# Patient Record
Sex: FEMALE | Race: WHITE | Employment: FULL TIME | URBAN - METROPOLITAN AREA
[De-identification: names, ages, dates, MRNs, and addresses within clinical notes are randomized per-mention and may not be internally consistent; named-entity substitution may affect disease eponyms.]

---

## 2019-04-12 ENCOUNTER — OFFICE VISIT (OUTPATIENT)
Dept: FAMILY MEDICINE CLINIC | Age: 29
End: 2019-04-12

## 2019-04-12 ENCOUNTER — HOSPITAL ENCOUNTER (OUTPATIENT)
Dept: LAB | Age: 29
Discharge: HOME | End: 2019-04-12
Payer: COMMERCIAL

## 2019-04-12 VITALS
OXYGEN SATURATION: 98 % | SYSTOLIC BLOOD PRESSURE: 110 MMHG | DIASTOLIC BLOOD PRESSURE: 74 MMHG | BODY MASS INDEX: 24.16 KG/M2 | WEIGHT: 145 LBS | HEART RATE: 70 BPM | RESPIRATION RATE: 17 BRPM | HEIGHT: 65 IN | TEMPERATURE: 98 F

## 2019-04-12 DIAGNOSIS — Z00.00 ANNUAL PHYSICAL EXAM: ICD-10-CM

## 2019-04-12 DIAGNOSIS — Z00.00 ANNUAL PHYSICAL EXAM: Primary | ICD-10-CM

## 2019-04-12 DIAGNOSIS — R82.90 ABNORMAL URINE: ICD-10-CM

## 2019-04-12 LAB
ALBUMIN SERPL-MCNC: 4.5 G/DL (ref 3.4–5)
ALBUMIN/GLOB SERPL: 1.7 {RATIO} (ref 0.8–1.7)
ALP SERPL-CCNC: 58 U/L (ref 45–117)
ALT SERPL-CCNC: 17 U/L (ref 13–56)
ANION GAP SERPL CALC-SCNC: 8 MMOL/L (ref 3–18)
APPEARANCE UR: CLEAR
AST SERPL-CCNC: 12 U/L (ref 15–37)
BACTERIA URNS QL MICRO: ABNORMAL /HPF
BASOPHILS # BLD: 0 K/UL (ref 0–0.1)
BASOPHILS NFR BLD: 1 % (ref 0–2)
BILIRUB SERPL-MCNC: 0.2 MG/DL (ref 0.2–1)
BILIRUB UR QL: NEGATIVE
BUN SERPL-MCNC: 11 MG/DL (ref 7–18)
BUN/CREAT SERPL: 15 (ref 12–20)
CALCIUM SERPL-MCNC: 8.5 MG/DL (ref 8.5–10.1)
CHLORIDE SERPL-SCNC: 106 MMOL/L (ref 100–108)
CHOLEST SERPL-MCNC: 137 MG/DL
CO2 SERPL-SCNC: 26 MMOL/L (ref 21–32)
COLOR UR: YELLOW
CREAT SERPL-MCNC: 0.71 MG/DL (ref 0.6–1.3)
DIFFERENTIAL METHOD BLD: NORMAL
EOSINOPHIL # BLD: 0.1 K/UL (ref 0–0.4)
EOSINOPHIL NFR BLD: 1 % (ref 0–5)
EPITH CASTS URNS QL MICRO: ABNORMAL /LPF (ref 0–5)
ERYTHROCYTE [DISTWIDTH] IN BLOOD BY AUTOMATED COUNT: 12.9 % (ref 11.6–14.5)
GLOBULIN SER CALC-MCNC: 2.7 G/DL (ref 2–4)
GLUCOSE SERPL-MCNC: 88 MG/DL (ref 74–99)
GLUCOSE UR STRIP.AUTO-MCNC: NEGATIVE MG/DL
HCT VFR BLD AUTO: 44.1 % (ref 35–45)
HDLC SERPL-MCNC: 72 MG/DL (ref 40–60)
HDLC SERPL: 1.9 {RATIO} (ref 0–5)
HGB BLD-MCNC: 14.4 G/DL (ref 12–16)
HGB UR QL STRIP: NEGATIVE
KETONES UR QL STRIP.AUTO: NEGATIVE MG/DL
LDLC SERPL CALC-MCNC: 59.6 MG/DL (ref 0–100)
LEUKOCYTE ESTERASE UR QL STRIP.AUTO: NEGATIVE
LIPID PROFILE,FLP: ABNORMAL
LYMPHOCYTES # BLD: 2 K/UL (ref 0.9–3.6)
LYMPHOCYTES NFR BLD: 36 % (ref 21–52)
MCH RBC QN AUTO: 29.4 PG (ref 24–34)
MCHC RBC AUTO-ENTMCNC: 32.7 G/DL (ref 31–37)
MCV RBC AUTO: 90 FL (ref 74–97)
MONOCYTES # BLD: 0.5 K/UL (ref 0.05–1.2)
MONOCYTES NFR BLD: 8 % (ref 3–10)
MUCOUS THREADS URNS QL MICRO: ABNORMAL /LPF
NEUTS SEG # BLD: 3 K/UL (ref 1.8–8)
NEUTS SEG NFR BLD: 54 % (ref 40–73)
NITRITE UR QL STRIP.AUTO: NEGATIVE
PH UR STRIP: 7.5 [PH] (ref 5–8)
PLATELET # BLD AUTO: 278 K/UL (ref 135–420)
PMV BLD AUTO: 9.8 FL (ref 9.2–11.8)
POTASSIUM SERPL-SCNC: 4.3 MMOL/L (ref 3.5–5.5)
PROT SERPL-MCNC: 7.2 G/DL (ref 6.4–8.2)
PROT UR STRIP-MCNC: NEGATIVE MG/DL
RBC # BLD AUTO: 4.9 M/UL (ref 4.2–5.3)
RBC #/AREA URNS HPF: ABNORMAL /HPF (ref 0–5)
SODIUM SERPL-SCNC: 140 MMOL/L (ref 136–145)
SP GR UR REFRACTOMETRY: 1.02 (ref 1–1.03)
TRIGL SERPL-MCNC: 27 MG/DL (ref ?–150)
UROBILINOGEN UR QL STRIP.AUTO: 0.2 EU/DL (ref 0.2–1)
VLDLC SERPL CALC-MCNC: 5.4 MG/DL
WBC # BLD AUTO: 5.5 K/UL (ref 4.6–13.2)
WBC URNS QL MICRO: ABNORMAL /HPF (ref 0–4)

## 2019-04-12 PROCEDURE — 81001 URINALYSIS AUTO W/SCOPE: CPT

## 2019-04-12 PROCEDURE — 36415 COLL VENOUS BLD VENIPUNCTURE: CPT

## 2019-04-12 PROCEDURE — 80061 LIPID PANEL: CPT

## 2019-04-12 PROCEDURE — 85025 COMPLETE CBC W/AUTO DIFF WBC: CPT

## 2019-04-12 PROCEDURE — 80053 COMPREHEN METABOLIC PANEL: CPT

## 2019-04-12 NOTE — PROGRESS NOTES
Elisabet Hampton Chief Complaint Patient presents with [address] [place] [address] Establish Care  Physical  
 
Vitals:  
 04/12/19 0818 BP: 110/74 Pulse: 70 Resp: 17 Temp: 98 °F (36.7 °C) TempSrc: Oral  
SpO2: 98% Weight: 145 lb (65.8 kg) Height: 5' 5\" (1.651 m) PainSc:   0 - No pain LMP: 03/20/2019  
 
 
, 
HPI: Patient is here to establish care and to get annual physical examination on any medication her past medical history is negative except from history of a scoliosis over 50 degrees and she had a surgery for spinal fusion and correction of the scoliosis at the age of 18, patient gets occasional back pain and she is seeing chiropractor and occasional physical therapy. Patient also due for well woman exam and Pap smear that will be scheduled next visit Patient does not smoke does not drink alcohol healthy lifestyle with regular exercise. History reviewed. No pertinent past medical history. Past Surgical History:  
Procedure Laterality Date  HX CERVICAL FUSION Social History Tobacco Use  Smoking status: Never Smoker  Smokeless tobacco: Never Used Substance Use Topics  Alcohol use: Not Currently Family History Problem Relation Age of Onset  Thyroid Disease Mother  Diabetes Father Review of Systems Constitutional: Negative for chills, fever, malaise/fatigue and weight loss. HENT: Negative for congestion, ear discharge, ear pain, hearing loss, nosebleeds, sinus pain and sore throat. Eyes: Negative for blurred vision, double vision and discharge. Respiratory: Negative for cough, hemoptysis, sputum production, shortness of breath and wheezing. Cardiovascular: Negative for chest pain, palpitations, claudication and leg swelling. Gastrointestinal: Negative for abdominal pain, blood in stool, constipation, diarrhea, nausea and vomiting. Genitourinary: Negative for dysuria, frequency, hematuria and urgency. Musculoskeletal: Positive for back pain. Negative for joint pain, myalgias and neck pain. Skin: Negative for itching and rash. Neurological: Negative for dizziness, tingling, sensory change, speech change, focal weakness, weakness and headaches. Psychiatric/Behavioral: Negative for depression, hallucinations, substance abuse and suicidal ideas. The patient is not nervous/anxious and does not have insomnia. Physical Exam  
Constitutional: She is oriented to person, place, and time. She appears well-developed and well-nourished. No distress. HENT:  
Head: Normocephalic and atraumatic. Mouth/Throat: Oropharynx is clear and moist. No oropharyngeal exudate. Eyes: Pupils are equal, round, and reactive to light. Conjunctivae are normal. Right eye exhibits no discharge. Left eye exhibits no discharge. No scleral icterus. Neck: Normal range of motion. Neck supple. No thyromegaly present. Cardiovascular: Normal rate, regular rhythm and normal heart sounds. No murmur heard. Pulmonary/Chest: Effort normal and breath sounds normal. No respiratory distress. She has no wheezes. She has no rales. She exhibits no tenderness. Abdominal: Soft. Bowel sounds are normal. She exhibits no distension. There is no tenderness. There is no rebound. Musculoskeletal: Normal range of motion. She exhibits no edema, tenderness or deformity. Lymphadenopathy:  
  She has no cervical adenopathy. Neurological: She is alert and oriented to person, place, and time. No cranial nerve deficit. Coordination normal.  
Skin: Skin is warm and dry. No rash noted. She is not diaphoretic. No erythema. No pallor. Psychiatric: She has a normal mood and affect. Her behavior is normal. Judgment and thought content normal.  
Nursing note and vitals reviewed. Assessment and plan  
 
Plan of care has been discussed with the patient,  agrees to the plan and verbalized understanding. All  questions were answered 1. Annual physical exam 
 
- CBC WITH AUTOMATED DIFF; Future - LIPID PANEL; Future - METABOLIC PANEL, COMPREHENSIVE; Future - URINALYSIS W/MICROSCOPIC; Future There are no active problems to display for this patient. No results found for this or any previous visit. Hospital Outpatient Visit on 04/12/2019 Component Date Value Ref Range Status  WBC 04/12/2019 5.5  4.6 - 13.2 K/uL Final  
 RBC 04/12/2019 4.90  4.20 - 5.30 M/uL Final  
 HGB 04/12/2019 14.4  12.0 - 16.0 g/dL Final  
 HCT 04/12/2019 44.1  35.0 - 45.0 % Final  
 MCV 04/12/2019 90.0  74.0 - 97.0 FL Final  
 MCH 04/12/2019 29.4  24.0 - 34.0 PG Final  
 MCHC 04/12/2019 32.7  31.0 - 37.0 g/dL Final  
 RDW 04/12/2019 12.9  11.6 - 14.5 % Final  
 PLATELET 77/93/0513 334  135 - 420 K/uL Final  
 MPV 04/12/2019 9.8  9.2 - 11.8 FL Final  
 NEUTROPHILS 04/12/2019 54  40 - 73 % Final  
 LYMPHOCYTES 04/12/2019 36  21 - 52 % Final  
 MONOCYTES 04/12/2019 8  3 - 10 % Final  
 EOSINOPHILS 04/12/2019 1  0 - 5 % Final  
 BASOPHILS 04/12/2019 1  0 - 2 % Final  
 ABS. NEUTROPHILS 04/12/2019 3.0  1.8 - 8.0 K/UL Final  
 ABS. LYMPHOCYTES 04/12/2019 2.0  0.9 - 3.6 K/UL Final  
 ABS. MONOCYTES 04/12/2019 0.5  0.05 - 1.2 K/UL Final  
 ABS. EOSINOPHILS 04/12/2019 0.1  0.0 - 0.4 K/UL Final  
 ABS. BASOPHILS 04/12/2019 0.0  0.0 - 0.1 K/UL Final  
 DF 04/12/2019 AUTOMATED    Final  
 LIPID PROFILE 04/12/2019        Final  
 Cholesterol, total 04/12/2019 137  <200 MG/DL Final  
 Triglyceride 04/12/2019 27  <150 MG/DL Final  
 Comment: The drugs N-acetylcysteine (NAC) and 
Metamiszole have been found to cause falsely 
low results in this chemical assay. Please 
be sure to submit blood samples obtained BEFORE administration of either of these 
drugs to assure correct results.  
  
 HDL Cholesterol 04/12/2019 72* 40 - 60 MG/DL Final  
 LDL, calculated 04/12/2019 59.6  0 - 100 MG/DL Final  
 VLDL, calculated 04/12/2019 5.4  MG/DL Final  
  CHOL/HDL Ratio 04/12/2019 1.9  0 - 5.0   Final  
 Sodium 04/12/2019 140  136 - 145 mmol/L Final  
 Potassium 04/12/2019 4.3  3.5 - 5.5 mmol/L Final  
 Chloride 04/12/2019 106  100 - 108 mmol/L Final  
 CO2 04/12/2019 26  21 - 32 mmol/L Final  
 Anion gap 04/12/2019 8  3.0 - 18 mmol/L Final  
 Glucose 04/12/2019 88  74 - 99 mg/dL Final  
 BUN 04/12/2019 11  7.0 - 18 MG/DL Final  
 Creatinine 04/12/2019 0.71  0.6 - 1.3 MG/DL Final  
 BUN/Creatinine ratio 04/12/2019 15  12 - 20   Final  
 GFR est AA 04/12/2019 >60  >60 ml/min/1.73m2 Final  
 GFR est non-AA 04/12/2019 >60  >60 ml/min/1.73m2 Final  
 Comment: (NOTE) Estimated GFR is calculated using the Modification of Diet in Renal  
Disease (MDRD) Study equation, reported for both  [facility]) and non- Americans (GFRNA), and normalized to 1.73m2  
body surface area. The physician must decide which value applies to  
the patient. The MDRD study equation should only be used in  
individuals age 25 or older. It has not been validated for the  
following: pregnant women, patients with serious comorbid conditions,  
or on certain medications, or persons with extremes of body size,  
muscle mass, or nutritional status.  Calcium 04/12/2019 8.5  8.5 - 10.1 MG/DL Final  
 Bilirubin, total 04/12/2019 0.2  0.2 - 1.0 MG/DL Final  
 ALT (SGPT) 04/12/2019 17  13 - 56 U/L Final  
 AST (SGOT) 04/12/2019 12* 15 - 37 U/L Final  
 Alk.  phosphatase 04/12/2019 58  45 - 117 U/L Final  
 Protein, total 04/12/2019 7.2  6.4 - 8.2 g/dL Final  
 Albumin 04/12/2019 4.5  3.4 - 5.0 g/dL Final  
 Globulin 04/12/2019 2.7  2.0 - 4.0 g/dL Final  
 A-G Ratio 04/12/2019 1.7  0.8 - 1.7   Final  
 Color 04/12/2019 YELLOW    Final  
 Appearance 04/12/2019 CLEAR    Final  
 Specific gravity 04/12/2019 1.020  1.005 - 1.030   Final  
 pH (UA) 04/12/2019 7.5  5.0 - 8.0   Final  
 Protein 04/12/2019 NEGATIVE   NEG mg/dL Final  
  Glucose 04/12/2019 NEGATIVE   NEG mg/dL Final  
 Ketone 04/12/2019 NEGATIVE   NEG mg/dL Final  
 Bilirubin 04/12/2019 NEGATIVE   NEG   Final  
 Blood 04/12/2019 NEGATIVE   NEG   Final  
 Urobilinogen 04/12/2019 0.2  0.2 - 1.0 EU/dL Final  
 Nitrites 04/12/2019 NEGATIVE   NEG   Final  
 Leukocyte Esterase 04/12/2019 NEGATIVE   NEG   Final  
 WBC 04/12/2019 0 to 3  0 - 4 /hpf Final  
 RBC 04/12/2019 0 to 3  0 - 5 /hpf Final  
 Epithelial cells 04/12/2019 2+  0 - 5 /lpf Final  
 Bacteria 04/12/2019 2+* NEG /hpf Final  
 Mucus 04/12/2019 1+* NEG /lpf Final  
  
 
 
Follow-up and Dispositions · Return in about 1 month (around 5/12/2019) for annual phyiscal .

## 2019-04-12 NOTE — PROGRESS NOTES
Amos Coates is a 34 y.o. female presents to office for physical 
 
Medication list has been reviewed with Amos Coates and updated as of today's date Health Maintenance items with a due date reviewed with patient: 
Health Maintenance Due Topic Date Due  
 HPV Age 9Y-34Y (3 - Female 3-dose series) 10/22/2007  DTaP/Tdap/Td series (1 - Tdap) 10/22/2013  PAP AKA CERVICAL CYTOLOGY  10/22/2013

## 2019-04-17 NOTE — PROGRESS NOTES
All results are within normal limit    Except urinary test has some bacteria if the patient having any symptoms she should come back for urine culture, otherwise it can be addressed next visit

## 2019-04-24 ENCOUNTER — TELEPHONE (OUTPATIENT)
Dept: FAMILY MEDICINE CLINIC | Age: 29
End: 2019-04-24

## 2019-04-24 NOTE — TELEPHONE ENCOUNTER
----- Message from Patito Marte MD sent at 4/17/2019  6:32 PM EDT -----  All results are within normal limit    Except urinary test has some bacteria if the patient having any symptoms she should come back for urine culture, otherwise it can be addressed next visit

## 2019-05-04 ENCOUNTER — HOSPITAL ENCOUNTER (OUTPATIENT)
Dept: LAB | Age: 29
Discharge: HOME | End: 2019-05-04
Payer: COMMERCIAL

## 2019-05-04 ENCOUNTER — OFFICE VISIT (OUTPATIENT)
Dept: FAMILY MEDICINE CLINIC | Age: 29
End: 2019-05-04

## 2019-05-04 VITALS
WEIGHT: 147.6 LBS | BODY MASS INDEX: 24.59 KG/M2 | SYSTOLIC BLOOD PRESSURE: 113 MMHG | DIASTOLIC BLOOD PRESSURE: 69 MMHG | OXYGEN SATURATION: 99 % | RESPIRATION RATE: 15 BRPM | HEIGHT: 65 IN | HEART RATE: 86 BPM | TEMPERATURE: 99.2 F

## 2019-05-04 DIAGNOSIS — Z12.4 SCREENING FOR CERVICAL CANCER: ICD-10-CM

## 2019-05-04 DIAGNOSIS — Z01.419 WELL WOMAN EXAM: Primary | ICD-10-CM

## 2019-05-04 PROCEDURE — 88175 CYTOPATH C/V AUTO FLUID REDO: CPT

## 2019-05-04 NOTE — PROGRESS NOTES
Vishnu Hutchinson Chief Complaint Patient presents with  Well Woman Vitals:  
 05/04/19 1410 BP: 113/69 Pulse: 86 Resp: 15 Temp: 99.2 °F (37.3 °C) TempSrc: Temporal  
SpO2: 99% Weight: 147 lb 9.6 oz (67 kg) Height: 5' 5\" (1.651 m) PainSc:   0 - No pain HPI: She is here for well woman examination she has no complaint today. Blood work has been reviewed with her from her physical all within normal limits, mucus and bacteria in the urine patient is asymptomatic no need for any further testing. Patient does not smoke does not drink alcohol and exercise regularly. Family history of hypertension in father. No past medical history on file. Past Surgical History:  
Procedure Laterality Date  HX CERVICAL FUSION Social History Tobacco Use  Smoking status: Never Smoker  Smokeless tobacco: Never Used Substance Use Topics  Alcohol use: Not Currently Family History Problem Relation Age of Onset  Thyroid Disease Mother  Diabetes Father Review of Systems Constitutional: Negative for chills, fever, malaise/fatigue and weight loss. HENT: Negative for congestion, ear discharge, ear pain, hearing loss and nosebleeds. Eyes: Negative for blurred vision, double vision and discharge. Respiratory: Negative for cough. Cardiovascular: Negative for chest pain, palpitations, claudication and leg swelling. Gastrointestinal: Negative for abdominal pain, constipation, diarrhea, nausea and vomiting. Genitourinary: Negative for dysuria, frequency and urgency. Musculoskeletal: Negative for myalgias. Skin: Negative for itching and rash. Neurological: Negative for dizziness, tingling, sensory change, speech change, focal weakness, weakness and headaches. Psychiatric/Behavioral: Negative for depression and suicidal ideas. Physical Exam  
Constitutional: She is oriented to person, place, and time.  She appears well-developed and well-nourished. No distress. HENT:  
Head: Normocephalic and atraumatic. Mouth/Throat: Oropharynx is clear and moist. No oropharyngeal exudate. Eyes: Pupils are equal, round, and reactive to light. Conjunctivae are normal. Right eye exhibits no discharge. Left eye exhibits no discharge. No scleral icterus. Neck: No thyromegaly present. Cardiovascular: Normal rate, regular rhythm and normal heart sounds. No murmur heard. Pulmonary/Chest: Effort normal and breath sounds normal. No respiratory distress. She has no wheezes. She has no rales. She exhibits no tenderness. Abdominal: Soft. She exhibits no distension. There is no tenderness. There is no rebound. Musculoskeletal: Normal range of motion. She exhibits no edema, tenderness or deformity. Neurological: She is alert and oriented to person, place, and time. No cranial nerve deficit. Coordination normal.  
Skin: Skin is warm and dry. No rash noted. She is not diaphoretic. No erythema. No pallor. Psychiatric: She has a normal mood and affect. Her behavior is normal. Judgment and thought content normal.  
Nursing note and vitals reviewed. Breasts: breasts appear normal, no suspicious masses, no skin or nipple changes or axillary nodes. Pap Smear Procedure After obtaining verbal consent . and patient was placed in the lithectomy position VULVA: normal appearing vulva with no masses, tenderness or lesions, VAGINA: normal appearing vagina with normal color and discharge, no lesions, CERVIX: normal 
 
Pap smear sample  was obtained using silicon broom . appearing cervix without discharge or lesions, UTERUS: uterus is normal size, shape, consistency and nontender, ADNEXA: normal adnexa in size, nontender and no masses. Patient tolerated procedure well Assessment and plan  
 
Plan of care has been discussed with the patient, he agrees to the plan and verbalized understanding. All his questions were answered More than 50% of the time spent in this visit was counseling the patient about  illness and treatment options 1. Screening for cervical cancer - PAP IG, RFX HPV ASCU, 53&02,49([id number]); Future 2. Well woman exam 
 
- PAP IG, RFX HPV ASCU, 75&61,08([id number]); Future There are no active problems to display for this patient. Results for orders placed or performed during the hospital encounter of 04/12/19 CBC WITH AUTOMATED DIFF Result Value Ref Range WBC 5.5 4.6 - 13.2 K/uL  
 RBC 4.90 4.20 - 5.30 M/uL  
 HGB 14.4 12.0 - 16.0 g/dL HCT 44.1 35.0 - 45.0 % MCV 90.0 74.0 - 97.0 FL  
 MCH 29.4 24.0 - 34.0 PG  
 MCHC 32.7 31.0 - 37.0 g/dL  
 RDW 12.9 11.6 - 14.5 % PLATELET 706 425 - 807 K/uL MPV 9.8 9.2 - 11.8 FL  
 NEUTROPHILS 54 40 - 73 % LYMPHOCYTES 36 21 - 52 % MONOCYTES 8 3 - 10 % EOSINOPHILS 1 0 - 5 % BASOPHILS 1 0 - 2 %  
 ABS. NEUTROPHILS 3.0 1.8 - 8.0 K/UL  
 ABS. LYMPHOCYTES 2.0 0.9 - 3.6 K/UL  
 ABS. MONOCYTES 0.5 0.05 - 1.2 K/UL  
 ABS. EOSINOPHILS 0.1 0.0 - 0.4 K/UL  
 ABS. BASOPHILS 0.0 0.0 - 0.1 K/UL  
 DF AUTOMATED    
LIPID PANEL Result Value Ref Range LIPID PROFILE Cholesterol, total 137 <200 MG/DL Triglyceride 27 <150 MG/DL  
 HDL Cholesterol 72 (H) 40 - 60 MG/DL  
 LDL, calculated 59.6 0 - 100 MG/DL VLDL, calculated 5.4 MG/DL  
 CHOL/HDL Ratio 1.9 0 - 5.0 METABOLIC PANEL, COMPREHENSIVE Result Value Ref Range Sodium 140 136 - 145 mmol/L Potassium 4.3 3.5 - 5.5 mmol/L Chloride 106 100 - 108 mmol/L  
 CO2 26 21 - 32 mmol/L Anion gap 8 3.0 - 18 mmol/L Glucose 88 74 - 99 mg/dL BUN 11 7.0 - 18 MG/DL Creatinine 0.71 0.6 - 1.3 MG/DL  
 BUN/Creatinine ratio 15 12 - 20 GFR est AA >60 >60 ml/min/1.73m2 GFR est non-AA >60 >60 ml/min/1.73m2 Calcium 8.5 8.5 - 10.1 MG/DL Bilirubin, total 0.2 0.2 - 1.0 MG/DL  
 ALT (SGPT) 17 13 - 56 U/L  
 AST (SGOT) 12 (L) 15 - 37 U/L Alk.  phosphatase 58 45 - 117 U/L  
 Protein, total 7.2 6.4 - 8.2 g/dL Albumin 4.5 3.4 - 5.0 g/dL Globulin 2.7 2.0 - 4.0 g/dL A-G Ratio 1.7 0.8 - 1.7 URINALYSIS W/MICROSCOPIC Result Value Ref Range Color YELLOW Appearance CLEAR Specific gravity 1.020 1.005 - 1.030    
 pH (UA) 7.5 5.0 - 8.0 Protein NEGATIVE  NEG mg/dL Glucose NEGATIVE  NEG mg/dL Ketone NEGATIVE  NEG mg/dL Bilirubin NEGATIVE  NEG Blood NEGATIVE  NEG Urobilinogen 0.2 0.2 - 1.0 EU/dL Nitrites NEGATIVE  NEG Leukocyte Esterase NEGATIVE  NEG    
 WBC 0 to 3 0 - 4 /hpf  
 RBC 0 to 3 0 - 5 /hpf Epithelial cells 2+ 0 - 5 /lpf Bacteria 2+ (A) NEG /hpf Mucus 1+ (A) NEG /lpf Hospital Outpatient Visit on 04/12/2019 Component Date Value Ref Range Status  WBC 04/12/2019 5.5  4.6 - 13.2 K/uL Final  
 RBC 04/12/2019 4.90  4.20 - 5.30 M/uL Final  
 HGB 04/12/2019 14.4  12.0 - 16.0 g/dL Final  
 HCT 04/12/2019 44.1  35.0 - 45.0 % Final  
 MCV 04/12/2019 90.0  74.0 - 97.0 FL Final  
 MCH 04/12/2019 29.4  24.0 - 34.0 PG Final  
 MCHC 04/12/2019 32.7  31.0 - 37.0 g/dL Final  
 RDW 04/12/2019 12.9  11.6 - 14.5 % Final  
 PLATELET 43/69/2412 331  135 - 420 K/uL Final  
 MPV 04/12/2019 9.8  9.2 - 11.8 FL Final  
 NEUTROPHILS 04/12/2019 54  40 - 73 % Final  
 LYMPHOCYTES 04/12/2019 36  21 - 52 % Final  
 MONOCYTES 04/12/2019 8  3 - 10 % Final  
 EOSINOPHILS 04/12/2019 1  0 - 5 % Final  
 BASOPHILS 04/12/2019 1  0 - 2 % Final  
 ABS. NEUTROPHILS 04/12/2019 3.0  1.8 - 8.0 K/UL Final  
 ABS. LYMPHOCYTES 04/12/2019 2.0  0.9 - 3.6 K/UL Final  
 ABS. MONOCYTES 04/12/2019 0.5  0.05 - 1.2 K/UL Final  
 ABS. EOSINOPHILS 04/12/2019 0.1  0.0 - 0.4 K/UL Final  
 ABS.  BASOPHILS 04/12/2019 0.0  0.0 - 0.1 K/UL Final  
 DF 04/12/2019 AUTOMATED    Final  
 LIPID PROFILE 04/12/2019        Final  
 Cholesterol, total 04/12/2019 137  <200 MG/DL Final  
 Triglyceride 04/12/2019 27  <150 MG/DL Final  
 Comment: The drugs N-acetylcysteine (NAC) and 
Metamiszole have been found to cause falsely 
low results in this chemical assay. Please 
be sure to submit blood samples obtained BEFORE administration of either of these 
drugs to assure correct results.  HDL Cholesterol 04/12/2019 72* 40 - 60 MG/DL Final  
 LDL, calculated 04/12/2019 59.6  0 - 100 MG/DL Final  
 VLDL, calculated 04/12/2019 5.4  MG/DL Final  
 CHOL/HDL Ratio 04/12/2019 1.9  0 - 5.0   Final  
 Sodium 04/12/2019 140  136 - 145 mmol/L Final  
 Potassium 04/12/2019 4.3  3.5 - 5.5 mmol/L Final  
 Chloride 04/12/2019 106  100 - 108 mmol/L Final  
 CO2 04/12/2019 26  21 - 32 mmol/L Final  
 Anion gap 04/12/2019 8  3.0 - 18 mmol/L Final  
 Glucose 04/12/2019 88  74 - 99 mg/dL Final  
 BUN 04/12/2019 11  7.0 - 18 MG/DL Final  
 Creatinine 04/12/2019 0.71  0.6 - 1.3 MG/DL Final  
 BUN/Creatinine ratio 04/12/2019 15  12 - 20   Final  
 GFR est AA 04/12/2019 >60  >60 ml/min/1.73m2 Final  
 GFR est non-AA 04/12/2019 >60  >60 ml/min/1.73m2 Final  
 Comment: (NOTE) Estimated GFR is calculated using the Modification of Diet in Renal  
Disease (MDRD) Study equation, reported for both  [facility] [place]) and non- Americans (GFRNA), and normalized to 1.73m2  
body surface area. The physician must decide which value applies to  
the patient. The MDRD study equation should only be used in  
individuals age 25 or older. It has not been validated for the  
following: pregnant women, patients with serious comorbid conditions,  
or on certain medications, or persons with extremes of body size,  
muscle mass, or nutritional status.  Calcium 04/12/2019 8.5  8.5 - 10.1 MG/DL Final  
 Bilirubin, total 04/12/2019 0.2  0.2 - 1.0 MG/DL Final  
 ALT (SGPT) 04/12/2019 17  13 - 56 U/L Final  
 AST (SGOT) 04/12/2019 12* 15 - 37 U/L Final  
 Alk.  phosphatase 04/12/2019 58  45 - 117 U/L Final  
  Protein, total 04/12/2019 7.2  6.4 - 8.2 g/dL Final  
 Albumin 04/12/2019 4.5  3.4 - 5.0 g/dL Final  
 Globulin 04/12/2019 2.7  2.0 - 4.0 g/dL Final  
 A-G Ratio 04/12/2019 1.7  0.8 - 1.7   Final  
 Color 04/12/2019 YELLOW    Final  
 Appearance 04/12/2019 CLEAR    Final  
 Specific gravity 04/12/2019 1.020  1.005 - 1.030   Final  
 pH (UA) 04/12/2019 7.5  5.0 - 8.0   Final  
 Protein 04/12/2019 NEGATIVE   NEG mg/dL Final  
 Glucose 04/12/2019 NEGATIVE   NEG mg/dL Final  
 Ketone 04/12/2019 NEGATIVE   NEG mg/dL Final  
 Bilirubin 04/12/2019 NEGATIVE   NEG   Final  
 Blood 04/12/2019 NEGATIVE   NEG   Final  
 Urobilinogen 04/12/2019 0.2  0.2 - 1.0 EU/dL Final  
 Nitrites 04/12/2019 NEGATIVE   NEG   Final  
 Leukocyte Esterase 04/12/2019 NEGATIVE   NEG   Final  
 WBC 04/12/2019 0 to 3  0 - 4 /hpf Final  
 RBC 04/12/2019 0 to 3  0 - 5 /hpf Final  
 Epithelial cells 04/12/2019 2+  0 - 5 /lpf Final  
 Bacteria 04/12/2019 2+* NEG /hpf Final  
 Mucus 04/12/2019 1+* NEG /lpf Final  
  
 
 
Follow-up and Dispositions · Return in about 1 year (around 5/4/2020) for Per results.

## 2019-05-04 NOTE — PROGRESS NOTES
Kaylee Dancer is a 34 y.o. female (: 1990) presenting to address: Chief Complaint Patient presents with  Well Woman Vitals:  
 19 1410 BP: 113/69 Pulse: 86 Resp: 15 Temp: 99.2 °F (37.3 °C) TempSrc: Temporal  
SpO2: 99% Weight: 147 lb 9.6 oz (67 kg) Height: 5' 5\" (1.651 m) PainSc:   0 - No pain Hearing/Vision: No exam data present Learning Assessment:  
 
Learning Assessment 2019 PRIMARY LEARNER Patient PRIMARY LANGUAGE ENGLISH  
LEARNER PREFERENCE PRIMARY DEMONSTRATION  
ANSWERED BY patient RELATIONSHIP SELF Depression Screening:  
 
3 most recent PHQ Screens 2019 Little interest or pleasure in doing things Not at all Feeling down, depressed, irritable, or hopeless Not at all Total Score PHQ 2 0 Fall Risk Assessment:  
 
Fall Risk Assessment, last 12 mths 2019 Able to walk? Yes Fall in past 12 months? No  
 
Abuse Screening:  
 
Abuse Screening Questionnaire 2019 Do you ever feel afraid of your partner? Duc Keenan Are you in a relationship with someone who physically or mentally threatens you? Duc Keenan Is it safe for you to go home? Alessia Mills Coordination of Care Questionaire: 1. Have you been to the ER, urgent care clinic since your last visit? Hospitalized since your last visit? NO 
 
2. Have you seen or consulted any other health care providers outside of the [address] [place] [address] since your last visit? Include any pap smears or colon screening.  NO

## 2020-08-20 ENCOUNTER — VIRTUAL VISIT (OUTPATIENT)
Dept: FAMILY MEDICINE CLINIC | Age: 30
End: 2020-08-20

## 2020-08-20 DIAGNOSIS — M54.16 RADICULOPATHY, LUMBAR REGION: ICD-10-CM

## 2020-08-20 DIAGNOSIS — M54.40 LOW BACK PAIN WITH SCIATICA, SCIATICA LATERALITY UNSPECIFIED, UNSPECIFIED BACK PAIN LATERALITY, UNSPECIFIED CHRONICITY: Primary | ICD-10-CM

## 2020-08-20 NOTE — PROGRESS NOTES
Zahra Oropeza is a 34 y.o. female who was seen by synchronous (real-time) audio-video technology on 8/20/2020 for LOW BACK PAIN    Patient has been complaining about low back pain, and she had physical therapy last year her symptoms improved by physical therapy, but now she had recurrence of symptoms which is low back pain  That radiates to her right hip and right thigh area with some numbness, she is currently undergoing physical therapy and she required to have a referral  If there is no improvement in her symptoms consider x-ray      Assessment & Plan:   Diagnoses and all orders for this visit:    1. Low back pain with sciatica, sciatica laterality unspecified, unspecified back pain laterality, unspecified chronicity  -     REFERRAL TO PHYSIATRY  -     XR SPINE LUMB 2 OR 3 V; Future    2. Radiculopathy, lumbar region  -     REFERRAL TO PHYSIATRY  -     XR SPINE LUMB 2 OR 3 V; Future          712  Subjective:       Prior to Admission medications    Not on File     There is no problem list on file for this patient. There are no active problems to display for this patient. No Known Allergies  No past medical history on file. Past Surgical History:   Procedure Laterality Date    HX CERVICAL FUSION       Family History   Problem Relation Age of Onset    Thyroid Disease Mother     Diabetes Father      Social History     Tobacco Use    Smoking status: Never Smoker    Smokeless tobacco: Never Used   Substance Use Topics    Alcohol use: Not Currently       Review of Systems   Constitutional: Negative for chills, fever, malaise/fatigue and weight loss. HENT: Negative for congestion, ear discharge, ear pain, hearing loss and nosebleeds. Eyes: Negative for blurred vision, double vision and discharge. Respiratory: Negative for cough. Cardiovascular: Negative for chest pain, palpitations, claudication and leg swelling.    Gastrointestinal: Negative for abdominal pain, constipation, diarrhea, nausea and vomiting. Genitourinary: Negative for dysuria, frequency and urgency. Musculoskeletal: Positive for back pain. Negative for falls, joint pain, myalgias and neck pain. Skin: Negative for itching and rash. Neurological: Positive for tingling. Negative for dizziness, sensory change, speech change, focal weakness, weakness and headaches. Psychiatric/Behavioral: Negative for depression and suicidal ideas. Objective:   No flowsheet data found. General: alert, cooperative, no distress   Mental  status: normal mood, behavior, speech, dress, motor activity, and thought processes, able to follow commands   HENT: NCAT   Neck: no visualized mass   Resp: no respiratory distress   Neuro: no gross deficits   Skin: no discoloration or lesions of concern on visible areas   Psychiatric: normal affect, consistent with stated mood, no evidence of hallucinations     Additional exam findings: We discussed the expected course, resolution and complications of the diagnosis(es) in detail. Medication risks, benefits, costs, interactions, and alternatives were discussed as indicated. I advised her to contact the office if her condition worsens, changes or fails to improve as anticipated. She expressed understanding with the diagnosis(es) and plan. Elizabeth Mills, who was evaluated through a patient-initiated, synchronous (real-time) audio-video encounter, and/or her healthcare decision maker, is aware that it is a billable service, with coverage as determined by her insurance carrier. She provided verbal consent to proceed: Yes, and patient identification was verified. It was conducted pursuant to the emergency declaration under the [facility], [address] [place] [address] authority and the [employer] Act. A caregiver was present when appropriate. Ability to conduct physical exam was limited. I was in the office.  The patient was at home.      Nash Rosas MD

## 2020-09-14 ENCOUNTER — TELEPHONE (OUTPATIENT)
Dept: FAMILY MEDICINE CLINIC | Age: 30
End: 2020-09-14

## 2021-05-21 ENCOUNTER — OFFICE VISIT (OUTPATIENT)
Dept: FAMILY MEDICINE CLINIC | Age: 31
End: 2021-05-21
Payer: COMMERCIAL

## 2021-05-21 VITALS
RESPIRATION RATE: 14 BRPM | SYSTOLIC BLOOD PRESSURE: 125 MMHG | HEART RATE: 89 BPM | WEIGHT: 151.4 LBS | DIASTOLIC BLOOD PRESSURE: 83 MMHG | TEMPERATURE: 98.7 F | OXYGEN SATURATION: 100 % | HEIGHT: 65 IN | BODY MASS INDEX: 25.22 KG/M2

## 2021-05-21 DIAGNOSIS — N64.4 PAINFUL LUMPY LEFT BREAST: Primary | ICD-10-CM

## 2021-05-21 DIAGNOSIS — N63.11 BREAST LUMP ON RIGHT SIDE AT 11 O'CLOCK POSITION: ICD-10-CM

## 2021-05-21 DIAGNOSIS — N63.20 PAINFUL LUMPY LEFT BREAST: Primary | ICD-10-CM

## 2021-05-21 PROCEDURE — 99213 OFFICE O/P EST LOW 20 MIN: CPT | Performed by: LEGAL MEDICINE

## 2021-05-21 NOTE — PROGRESS NOTES
Lucas Price is a 31 y.o. female (: 1990) presenting to address:    Chief Complaint   Patient presents with    Breast Problem     left lump       Vitals:    21 1513   BP: 125/83   Pulse: 89   Resp: 14   Temp: 98.7 °F (37.1 °C)   TempSrc: Temporal   SpO2: 100%   Weight: 151 lb 6.4 oz (68.7 kg)   Height: 5' 5\" (1.651 m)   PainSc:   0 - No pain   LMP: 2021       Is someone accompanying this pt? NO    Is the patient using any DME equipment during OV? NO    Hearing/Vision:   No exam data present    Learning Assessment:     Learning Assessment 2019   PRIMARY LEARNER Patient   PRIMARY LANGUAGE ENGLISH   LEARNER PREFERENCE PRIMARY DEMONSTRATION   ANSWERED BY patient   RELATIONSHIP SELF     Depression Screening:     3 most recent PHQ Screens 2021   Little interest or pleasure in doing things Not at all   Feeling down, depressed, irritable, or hopeless Not at all   Total Score PHQ 2 0     Fall Risk Assessment:     Fall Risk Assessment, last 12 mths 2019   Able to walk? Yes   Fall in past 12 months? No     Coordination of Care Questionaire:   1. Have you been to the ER, urgent care clinic since your last visit? Hospitalized since your last visit? NO    2. Have you seen or consulted any other health care providers outside of the [address] [place] [address] since your last visit? Include any pap smears or colon screening. NO    Advanced Directive:   1. Do you have an Advanced Directive? NO    2. Would you like information on Advanced Directives?  NO

## 2021-05-21 NOTE — PROGRESS NOTES
Donnell Adkins     Chief Complaint   Patient presents with    Breast Problem     left lump     Vitals:    05/21/21 1513   BP: 125/83   Pulse: 89   Resp: 14   Temp: 98.7 °F (37.1 °C)   TempSrc: Temporal   SpO2: 100%   Weight: 151 lb 6.4 oz (68.7 kg)   Height: 5' 5\" (1.651 m)   PainSc:   0 - No pain   LMP: 05/18/2021         HPI: left breast pain for  Over one  month and lump no nipple  Discharged  FH of Breast cancer is negative     Mother has  endometrial cancer no family history of breast cancer    She is to have breast lumps during menstruation but it would go away but this lump has stayed for over 1 month    No past medical history on file. Past Surgical History:   Procedure Laterality Date    HX CERVICAL FUSION       Social History     Tobacco Use    Smoking status: Never Smoker    Smokeless tobacco: Never Used   Substance Use Topics    Alcohol use: Not Currently       Family History   Problem Relation Age of Onset    Thyroid Disease Mother     Diabetes Father        Review of Systems   Constitutional: Negative for chills, fever, malaise/fatigue and weight loss. HENT: Negative for congestion, ear discharge, ear pain, hearing loss and nosebleeds. Eyes: Negative for blurred vision, double vision and discharge. Respiratory: Negative for cough, hemoptysis, sputum production, shortness of breath and wheezing. Cardiovascular: Negative for chest pain, palpitations, claudication and leg swelling. Gastrointestinal: Negative for abdominal pain, constipation, diarrhea, nausea and vomiting. Genitourinary: Negative for dysuria, frequency and urgency. Musculoskeletal: Negative for back pain, joint pain, myalgias and neck pain. Skin: Negative for itching and rash. Neurological: Negative for dizziness, tingling, sensory change, speech change, focal weakness, weakness and headaches. Psychiatric/Behavioral: Negative for depression and suicidal ideas.        Physical Exam  Constitutional: General: She is not in acute distress. Appearance: She is well-developed and normal weight. She is not diaphoretic. HENT:      Head: Normocephalic and atraumatic. Eyes:      General: No scleral icterus. Right eye: No discharge. Left eye: No discharge. Neck:      Thyroid: No thyromegaly. Cardiovascular:      Rate and Rhythm: Normal rate and regular rhythm. Heart sounds: Normal heart sounds. Pulmonary:      Effort: Pulmonary effort is normal. No respiratory distress. Breath sounds: Normal breath sounds. No wheezing or rales. Chest:      Chest wall: No tenderness. Abdominal:      General: There is no distension. Palpations: Abdomen is soft. Tenderness: There is no abdominal tenderness. There is no rebound. Musculoskeletal:         General: No tenderness or deformity. Normal range of motion. Lymphadenopathy:      Cervical: No cervical adenopathy. Skin:     General: Skin is warm and dry. Coloration: Skin is not pale. Findings: No erythema or rash. Comments: Breast lump on right side at 11 o'clock position is about 2 cm mild  tenderness  no axillary LN no nipple discharge bilaterally     Left breast has a lump less than 2 cm mild tenderness  xillary LN no nipple discharge bilaterally      Neurological:      Mental Status: She is alert and oriented to person, place, and time. Cranial Nerves: No cranial nerve deficit. Coordination: Coordination normal.   Psychiatric:         Behavior: Behavior normal.         Thought Content: Thought content normal.         Judgment: Judgment normal.          Assessment and plan     Plan of care has been discussed with the patient, he agrees to the plan and verbalized understanding. All his questions were answered  More than 50% of the time spent in this visit was counseling the patient about  illness and treatment options         1.  Painful lumpy left breast    - [facility] 3D JIAN W MAMMO BI DX INCL CAD; Future  - US BREASTS LIMITED=<3 QUAD; Future    2. Breast lump on right side at 11 o'clock position    - [facility] 3D JIAN W MAMMO BI DX INCL CAD; Future  - US BREASTS LIMITED=<3 QUAD; Future        There are no problems to display for this patient. Results for orders placed or performed during the hospital encounter of 04/12/19   CBC WITH AUTOMATED DIFF   Result Value Ref Range    WBC 5.5 4.6 - 13.2 K/uL    RBC 4.90 4.20 - 5.30 M/uL    HGB 14.4 12.0 - 16.0 g/dL    HCT 44.1 35.0 - 45.0 %    MCV 90.0 74.0 - 97.0 FL    MCH 29.4 24.0 - 34.0 PG    MCHC 32.7 31.0 - 37.0 g/dL    RDW 12.9 11.6 - 14.5 %    PLATELET 560 821 - 765 K/uL    MPV 9.8 9.2 - 11.8 FL    NEUTROPHILS 54 40 - 73 %    LYMPHOCYTES 36 21 - 52 %    MONOCYTES 8 3 - 10 %    EOSINOPHILS 1 0 - 5 %    BASOPHILS 1 0 - 2 %    ABS. NEUTROPHILS 3.0 1.8 - 8.0 K/UL    ABS. LYMPHOCYTES 2.0 0.9 - 3.6 K/UL    ABS. MONOCYTES 0.5 0.05 - 1.2 K/UL    ABS. EOSINOPHILS 0.1 0.0 - 0.4 K/UL    ABS. BASOPHILS 0.0 0.0 - 0.1 K/UL    DF AUTOMATED     LIPID PANEL   Result Value Ref Range    LIPID PROFILE          Cholesterol, total 137 <200 MG/DL    Triglyceride 27 <150 MG/DL    HDL Cholesterol 72 (H) 40 - 60 MG/DL    LDL, calculated 59.6 0 - 100 MG/DL    VLDL, calculated 5.4 MG/DL    CHOL/HDL Ratio 1.9 0 - 5.0     METABOLIC PANEL, COMPREHENSIVE   Result Value Ref Range    Sodium 140 136 - 145 mmol/L    Potassium 4.3 3.5 - 5.5 mmol/L    Chloride 106 100 - 108 mmol/L    CO2 26 21 - 32 mmol/L    Anion gap 8 3.0 - 18 mmol/L    Glucose 88 74 - 99 mg/dL    BUN 11 7.0 - 18 MG/DL    Creatinine 0.71 0.6 - 1.3 MG/DL    BUN/Creatinine ratio 15 12 - 20      GFR est AA >60 >60 ml/min/1.73m2    GFR est non-AA >60 >60 ml/min/1.73m2    Calcium 8.5 8.5 - 10.1 MG/DL    Bilirubin, total 0.2 0.2 - 1.0 MG/DL    ALT (SGPT) 17 13 - 56 U/L    AST (SGOT) 12 (L) 15 - 37 U/L    Alk.  phosphatase 58 45 - 117 U/L    Protein, total 7.2 6.4 - 8.2 g/dL    Albumin 4.5 3.4 - 5.0 g/dL    Globulin 2.7 2.0 - 4.0 g/dL A-G Ratio 1.7 0.8 - 1.7     URINALYSIS W/MICROSCOPIC   Result Value Ref Range    Color YELLOW      Appearance CLEAR      Specific gravity 1.020 1.005 - 1.030      pH (UA) 7.5 5.0 - 8.0      Protein NEGATIVE  NEG mg/dL    Glucose NEGATIVE  NEG mg/dL    Ketone NEGATIVE  NEG mg/dL    Bilirubin NEGATIVE  NEG      Blood NEGATIVE  NEG      Urobilinogen 0.2 0.2 - 1.0 EU/dL    Nitrites NEGATIVE  NEG      Leukocyte Esterase NEGATIVE  NEG      WBC 0 to 3 0 - 4 /hpf    RBC 0 to 3 0 - 5 /hpf    Epithelial cells 2+ 0 - 5 /lpf    Bacteria 2+ (A) NEG /hpf    Mucus 1+ (A) NEG /lpf     No visits with results within 3 Month(s) from this visit. Latest known visit with results is:   Hospital Outpatient Visit on 04/12/2019   Component Date Value Ref Range Status    WBC 04/12/2019 5.5  4.6 - 13.2 K/uL Final    RBC 04/12/2019 4.90  4.20 - 5.30 M/uL Final    HGB 04/12/2019 14.4  12.0 - 16.0 g/dL Final    HCT 04/12/2019 44.1  35.0 - 45.0 % Final    MCV 04/12/2019 90.0  74.0 - 97.0 FL Final    MCH 04/12/2019 29.4  24.0 - 34.0 PG Final    MCHC 04/12/2019 32.7  31.0 - 37.0 g/dL Final    RDW 04/12/2019 12.9  11.6 - 14.5 % Final    PLATELET 62/34/5954 165  135 - 420 K/uL Final    MPV 04/12/2019 9.8  9.2 - 11.8 FL Final    NEUTROPHILS 04/12/2019 54  40 - 73 % Final    LYMPHOCYTES 04/12/2019 36  21 - 52 % Final    MONOCYTES 04/12/2019 8  3 - 10 % Final    EOSINOPHILS 04/12/2019 1  0 - 5 % Final    BASOPHILS 04/12/2019 1  0 - 2 % Final    ABS. NEUTROPHILS 04/12/2019 3.0  1.8 - 8.0 K/UL Final    ABS. LYMPHOCYTES 04/12/2019 2.0  0.9 - 3.6 K/UL Final    ABS. MONOCYTES 04/12/2019 0.5  0.05 - 1.2 K/UL Final    ABS. EOSINOPHILS 04/12/2019 0.1  0.0 - 0.4 K/UL Final    ABS.  BASOPHILS 04/12/2019 0.0  0.0 - 0.1 K/UL Final    DF 04/12/2019 AUTOMATED    Final    LIPID PROFILE 04/12/2019        Final    Cholesterol, total 04/12/2019 137  <200 MG/DL Final    Triglyceride 04/12/2019 27  <150 MG/DL Final    Comment: The drugs N-acetylcysteine (NAC) and  Metamiszole have been found to cause falsely  low results in this chemical assay. Please  be sure to submit blood samples obtained  BEFORE administration of either of these  drugs to assure correct results.  HDL Cholesterol 04/12/2019 72* 40 - 60 MG/DL Final    LDL, calculated 04/12/2019 59.6  0 - 100 MG/DL Final    VLDL, calculated 04/12/2019 5.4  MG/DL Final    CHOL/HDL Ratio 04/12/2019 1.9  0 - 5.0   Final    Sodium 04/12/2019 140  136 - 145 mmol/L Final    Potassium 04/12/2019 4.3  3.5 - 5.5 mmol/L Final    Chloride 04/12/2019 106  100 - 108 mmol/L Final    CO2 04/12/2019 26  21 - 32 mmol/L Final    Anion gap 04/12/2019 8  3.0 - 18 mmol/L Final    Glucose 04/12/2019 88  74 - 99 mg/dL Final    BUN 04/12/2019 11  7.0 - 18 MG/DL Final    Creatinine 04/12/2019 0.71  0.6 - 1.3 MG/DL Final    BUN/Creatinine ratio 04/12/2019 15  12 - 20   Final    GFR est AA 04/12/2019 >60  >60 ml/min/1.73m2 Final    GFR est non-AA 04/12/2019 >60  >60 ml/min/1.73m2 Final    Comment: (NOTE)  Estimated GFR is calculated using the Modification of Diet in Renal   Disease (MDRD) Study equation, reported for both  Americans   (GFRAA) and non- Americans (GFRNA), and normalized to 1.73m2   body surface area. The physician must decide which value applies to   the patient. The MDRD study equation should only be used in   individuals age 25 or older. It has not been validated for the   following: pregnant women, patients with serious comorbid conditions,   or on certain medications, or persons with extremes of body size,   muscle mass, or nutritional status.  Calcium 04/12/2019 8.5  8.5 - 10.1 MG/DL Final    Bilirubin, total 04/12/2019 0.2  0.2 - 1.0 MG/DL Final    ALT (SGPT) 04/12/2019 17  13 - 56 U/L Final    AST (SGOT) 04/12/2019 12* 15 - 37 U/L Final    Alk.  phosphatase 04/12/2019 58  45 - 117 U/L Final    Protein, total 04/12/2019 7.2  6.4 - 8.2 g/dL Final    Albumin 04/12/2019 4.5  3.4 - 5.0 g/dL Final    Globulin 04/12/2019 2.7  2.0 - 4.0 g/dL Final    A-G Ratio 04/12/2019 1.7  0.8 - 1.7   Final    Color 04/12/2019 YELLOW    Final    Appearance 04/12/2019 CLEAR    Final    Specific gravity 04/12/2019 1.020  1.005 - 1.030   Final    pH (UA) 04/12/2019 7.5  5.0 - 8.0   Final    Protein 04/12/2019 NEGATIVE   NEG mg/dL Final    Glucose 04/12/2019 NEGATIVE   NEG mg/dL Final    Ketone 04/12/2019 NEGATIVE   NEG mg/dL Final    Bilirubin 04/12/2019 NEGATIVE   NEG   Final    Blood 04/12/2019 NEGATIVE   NEG   Final    Urobilinogen 04/12/2019 0.2  0.2 - 1.0 EU/dL Final    Nitrites 04/12/2019 NEGATIVE   NEG   Final    Leukocyte Esterase 04/12/2019 NEGATIVE   NEG   Final    WBC 04/12/2019 0 to 3  0 - 4 /hpf Final    RBC 04/12/2019 0 to 3  0 - 5 /hpf Final    Epithelial cells 04/12/2019 2+  0 - 5 /lpf Final    Bacteria 04/12/2019 2+* NEG /hpf Final    Mucus 04/12/2019 1+* NEG /lpf Final          Follow-up and Dispositions    · Return for as per results, for annual physical.

## 2021-05-23 ENCOUNTER — TRANSCRIBE ORDER (OUTPATIENT)
Dept: SCHEDULING | Age: 31
End: 2021-05-23

## 2021-05-23 DIAGNOSIS — N63.11 UNSPECIFIED LUMP IN THE RIGHT BREAST, UPPER OUTER QUADRANT: ICD-10-CM

## 2021-05-23 DIAGNOSIS — N64.4 BREAST PAIN: Primary | ICD-10-CM

## 2021-05-23 DIAGNOSIS — N63.20 MASS OF LEFT BREAST: ICD-10-CM

## 2021-06-08 ENCOUNTER — HOSPITAL ENCOUNTER (OUTPATIENT)
Dept: ULTRASOUND IMAGING | Age: 31
Discharge: HOME | End: 2021-06-08
Attending: LEGAL MEDICINE
Payer: COMMERCIAL

## 2021-06-08 ENCOUNTER — HOSPITAL ENCOUNTER (OUTPATIENT)
Dept: WOMENS IMAGING | Age: 31
Discharge: HOME | End: 2021-06-08
Attending: LEGAL MEDICINE
Payer: COMMERCIAL

## 2021-06-08 DIAGNOSIS — N63.20 PAINFUL LUMPY LEFT BREAST: ICD-10-CM

## 2021-06-08 DIAGNOSIS — N64.4 PAINFUL LUMPY LEFT BREAST: ICD-10-CM

## 2021-06-08 DIAGNOSIS — N63.11 UNSPECIFIED LUMP IN THE RIGHT BREAST, UPPER OUTER QUADRANT: ICD-10-CM

## 2021-06-08 DIAGNOSIS — N63.11 BREAST LUMP ON RIGHT SIDE AT 11 O'CLOCK POSITION: ICD-10-CM

## 2021-06-08 DIAGNOSIS — N63.20 MASS OF LEFT BREAST: ICD-10-CM

## 2021-06-08 DIAGNOSIS — N64.4 BREAST PAIN: ICD-10-CM

## 2021-06-08 PROCEDURE — 76642 ULTRASOUND BREAST LIMITED: CPT

## 2021-06-08 NOTE — PROGRESS NOTES
No mammographic or sonographic evidence for malignancy.     -Recommend routine annual screening mammogram beginning at age 36, unless  otherwise clinically indicated.     -The patient should examine any area of concern on a monthly basis and to return  sooner if any changes occur.